# Patient Record
Sex: MALE | Race: BLACK OR AFRICAN AMERICAN | NOT HISPANIC OR LATINO | Employment: FULL TIME | URBAN - METROPOLITAN AREA
[De-identification: names, ages, dates, MRNs, and addresses within clinical notes are randomized per-mention and may not be internally consistent; named-entity substitution may affect disease eponyms.]

---

## 2023-03-16 LAB
RPR MONITORING: NORMAL
VDRL TITER: ABNORMAL
VDRL: REACTIVE

## 2023-08-24 LAB
CHLAMYDIA TRACH., AMPLIFIED: NEGATIVE
HEPATITIS B VIRUS SURFACE AG PRESENCE IN SERUM: NONREACTIVE
HEPATITIS C VIRUS AB PRESENCE IN SERUM: NONREACTIVE
HIV 1/ 2 AG/AB SCREEN: NONREACTIVE
N. GONORRHEA, AMPLIFIED: NEGATIVE
SYPHILIS TOTAL AB: REACTIVE

## 2023-08-25 LAB
RPR: NONREACTIVE
SYPHILIS INTEGRATED INTERPRETATION: ABNORMAL
SYPHILIS TOTAL AB: REACTIVE
TREPONEMA PALLIDUM CONFIRMATION: REACTIVE

## 2024-01-31 ENCOUNTER — LAB (OUTPATIENT)
Dept: LAB | Facility: LAB | Age: 29
End: 2024-01-31
Payer: COMMERCIAL

## 2024-01-31 ENCOUNTER — OFFICE VISIT (OUTPATIENT)
Dept: PRIMARY CARE | Facility: CLINIC | Age: 29
End: 2024-01-31
Payer: COMMERCIAL

## 2024-01-31 VITALS
HEIGHT: 74 IN | WEIGHT: 290 LBS | SYSTOLIC BLOOD PRESSURE: 114 MMHG | BODY MASS INDEX: 37.22 KG/M2 | TEMPERATURE: 98 F | DIASTOLIC BLOOD PRESSURE: 59 MMHG | HEART RATE: 72 BPM | OXYGEN SATURATION: 99 % | RESPIRATION RATE: 16 BRPM

## 2024-01-31 DIAGNOSIS — E55.9 VITAMIN D DEFICIENCY: ICD-10-CM

## 2024-01-31 DIAGNOSIS — K76.0 FATTY LIVER: ICD-10-CM

## 2024-01-31 DIAGNOSIS — E66.09 CLASS 2 OBESITY DUE TO EXCESS CALORIES WITHOUT SERIOUS COMORBIDITY WITH BODY MASS INDEX (BMI) OF 37.0 TO 37.9 IN ADULT: ICD-10-CM

## 2024-01-31 DIAGNOSIS — Z23 ENCOUNTER FOR IMMUNIZATION: ICD-10-CM

## 2024-01-31 DIAGNOSIS — J45.20 INTERMITTENT ASTHMA, UNSPECIFIED ASTHMA SEVERITY, UNSPECIFIED WHETHER COMPLICATED (HHS-HCC): ICD-10-CM

## 2024-01-31 DIAGNOSIS — Z00.00 ANNUAL PHYSICAL EXAM: ICD-10-CM

## 2024-01-31 DIAGNOSIS — Z00.00 ANNUAL PHYSICAL EXAM: Primary | ICD-10-CM

## 2024-01-31 LAB
ERYTHROCYTE [DISTWIDTH] IN BLOOD BY AUTOMATED COUNT: 12.4 % (ref 11.5–14.5)
HCT VFR BLD AUTO: 44.5 % (ref 41–52)
HGB BLD-MCNC: 15.7 G/DL (ref 13.5–17.5)
MCH RBC QN AUTO: 32.7 PG (ref 26–34)
MCHC RBC AUTO-ENTMCNC: 35.3 G/DL (ref 32–36)
MCV RBC AUTO: 93 FL (ref 80–100)
NRBC BLD-RTO: 0 /100 WBCS (ref 0–0)
PLATELET # BLD AUTO: 269 X10*3/UL (ref 150–450)
RBC # BLD AUTO: 4.8 X10*6/UL (ref 4.5–5.9)
WBC # BLD AUTO: 4.3 X10*3/UL (ref 4.4–11.3)

## 2024-01-31 PROCEDURE — 90471 IMMUNIZATION ADMIN: CPT | Performed by: FAMILY MEDICINE

## 2024-01-31 PROCEDURE — 80061 LIPID PANEL: CPT

## 2024-01-31 PROCEDURE — 99395 PREV VISIT EST AGE 18-39: CPT | Performed by: FAMILY MEDICINE

## 2024-01-31 PROCEDURE — 36415 COLL VENOUS BLD VENIPUNCTURE: CPT

## 2024-01-31 PROCEDURE — 90715 TDAP VACCINE 7 YRS/> IM: CPT | Performed by: FAMILY MEDICINE

## 2024-01-31 PROCEDURE — 85027 COMPLETE CBC AUTOMATED: CPT

## 2024-01-31 PROCEDURE — 80048 BASIC METABOLIC PNL TOTAL CA: CPT

## 2024-01-31 PROCEDURE — 4004F PT TOBACCO SCREEN RCVD TLK: CPT | Performed by: FAMILY MEDICINE

## 2024-01-31 PROCEDURE — 84450 TRANSFERASE (AST) (SGOT): CPT

## 2024-01-31 PROCEDURE — 82306 VITAMIN D 25 HYDROXY: CPT

## 2024-01-31 PROCEDURE — 3008F BODY MASS INDEX DOCD: CPT | Performed by: FAMILY MEDICINE

## 2024-01-31 PROCEDURE — 84460 ALANINE AMINO (ALT) (SGPT): CPT

## 2024-01-31 ASSESSMENT — PATIENT HEALTH QUESTIONNAIRE - PHQ9
2. FEELING DOWN, DEPRESSED OR HOPELESS: NOT AT ALL
SUM OF ALL RESPONSES TO PHQ9 QUESTIONS 1 AND 2: 0
1. LITTLE INTEREST OR PLEASURE IN DOING THINGS: NOT AT ALL

## 2024-01-31 NOTE — PATIENT INSTRUCTIONS
Fasting labs.  Liver ultrasound.  Pulmonary function tests ordered (DO NOT USE INHALER ON DAY OF PFTs).  Recommend weight loss efforts (see www.yourweightmatters.org/category/nutrition for ideas).   Recommend smoking cessation.  Call Tobacco Quit Line (4-789-QUIT-NOW) for resources and support.   Tetanus shot provided.    F/U 1 year: Annual wellness visit.

## 2024-01-31 NOTE — PROGRESS NOTES
"Subjective   Patient ID: Baldev Gonzalez is a 28 y.o. male who presents for Annual Exam.    HPI   Initial visit.    Patient's health is described as good.  Regular dental visits: Yes.  Dental hygiene (brushing/flossing) regularly performed: Yes.  Corrective lenses: Yes.  Vision problems: No.  Last eye exam within 1 year: No.  Hearing loss: No.  Requests audiology referral: No.  Immunizations up to date: No (tetanus).  Healthy diet: Yes.  Regular exercise: No.  Trying to lose weight: No.  Requests nutrition/weight loss referral: No.  Sexually active: Yes.  Using contraception: No.  Requests STD screening: No.  Colon cancer screening up to date: N/A.  Lung cancer screening up to date: N/A.  Hepatitis C screening up to date: Yes.    H/O Asthma.  Dx as a child, never had PFTs.  No maintenance inhaler.  Only needs rescue inhaler when he gets respiratory infections.    H/O Vit d deficiency.  Requests Vit D level.    Review of Systems  No other complaints.     Objective   /59   Pulse 72   Temp 36.7 °C (98 °F)   Resp 16   Ht 1.88 m (6' 2\")   Wt 132 kg (290 lb)   SpO2 99%   BMI 37.23 kg/m²     Physical Exam  Constitutional:       General: He is not in acute distress.     Appearance: He is obese.   HENT:      Head: Normocephalic.      Right Ear: Tympanic membrane normal.      Left Ear: Tympanic membrane normal.      Mouth/Throat:      Pharynx: Oropharynx is clear. No oropharyngeal exudate or posterior oropharyngeal erythema.   Eyes:      Extraocular Movements: Extraocular movements intact.      Conjunctiva/sclera: Conjunctivae normal.      Pupils: Pupils are equal, round, and reactive to light.   Neck:      Vascular: No carotid bruit.   Cardiovascular:      Rate and Rhythm: Normal rate and regular rhythm.      Heart sounds: Normal heart sounds. No murmur heard.     No friction rub. No gallop.   Pulmonary:      Effort: Pulmonary effort is normal.      Breath sounds: Normal breath sounds. No wheezing, rhonchi or " rales.   Abdominal:      General: Bowel sounds are normal. There is no distension.      Palpations: Abdomen is soft. There is no mass.      Tenderness: There is no abdominal tenderness. There is no guarding or rebound.   Lymphadenopathy:      Cervical: No cervical adenopathy.   Skin:     Coloration: Skin is not jaundiced or pale.   Neurological:      General: No focal deficit present.      Mental Status: He is oriented to person, place, and time.   Psychiatric:         Mood and Affect: Mood normal.         Behavior: Behavior normal.     Assessment/Plan   Diagnoses and all orders for this visit:  Annual physical exam  -     CBC; Future  -     Basic Metabolic Panel; Future  -     Lipid Panel; Future  -     Aspartate Aminotransferase; Future  -     Alanine Aminotransferase; Future  Intermittent asthma, unspecified asthma severity, unspecified whether complicated  -     Complete Pulmonary Function Test Pre/Post Bronchodialator (Spirometry Pre/Post/DLCO/Lung Volumes); Future  Fatty liver  -     US abdomen limited liver; Future  Vitamin D deficiency  -     Vitamin D 25-Hydroxy,Total (for eval of Vitamin D levels); Future  Encounter for immunization  -     Tdap vaccine, age 7 years and older  Class 2 obesity due to excess calories without serious comorbidity with body mass index (BMI) of 37.0 to 37.9 in adult    Fasting labs.  Liver ultrasound.  Pulmonary function tests ordered (DO NOT USE INHALER ON DAY OF PFTs).  Recommend weight loss efforts (see www.yourweightmatters.org/category/nutrition for ideas).   Recommend smoking cessation.  Call Tobacco Quit Line (8-868-QUIT-NOW) for resources and support.   Tetanus shot provided.    F/U 1 year: Annual wellness visit.

## 2024-02-01 LAB
25(OH)D3 SERPL-MCNC: 21 NG/ML (ref 30–100)
ALT SERPL W P-5'-P-CCNC: 62 U/L (ref 10–52)
ANION GAP SERPL CALC-SCNC: 11 MMOL/L (ref 10–20)
AST SERPL W P-5'-P-CCNC: 34 U/L (ref 9–39)
BUN SERPL-MCNC: 7 MG/DL (ref 6–23)
CALCIUM SERPL-MCNC: 10 MG/DL (ref 8.6–10.6)
CHLORIDE SERPL-SCNC: 105 MMOL/L (ref 98–107)
CHOLEST SERPL-MCNC: 213 MG/DL (ref 0–199)
CHOLESTEROL/HDL RATIO: 5.2
CO2 SERPL-SCNC: 29 MMOL/L (ref 21–32)
CREAT SERPL-MCNC: 1.07 MG/DL (ref 0.5–1.3)
EGFRCR SERPLBLD CKD-EPI 2021: >90 ML/MIN/1.73M*2
GLUCOSE SERPL-MCNC: 104 MG/DL (ref 74–99)
HDLC SERPL-MCNC: 40.8 MG/DL
LDLC SERPL CALC-MCNC: 150 MG/DL
NON HDL CHOLESTEROL: 172 MG/DL (ref 0–149)
POTASSIUM SERPL-SCNC: 3.8 MMOL/L (ref 3.5–5.3)
SODIUM SERPL-SCNC: 141 MMOL/L (ref 136–145)
TRIGL SERPL-MCNC: 112 MG/DL (ref 0–149)
VLDL: 22 MG/DL (ref 0–40)

## 2024-02-02 ENCOUNTER — APPOINTMENT (OUTPATIENT)
Dept: RADIOLOGY | Facility: CLINIC | Age: 29
End: 2024-02-02
Payer: COMMERCIAL

## 2024-02-05 ENCOUNTER — HOSPITAL ENCOUNTER (OUTPATIENT)
Dept: RADIOLOGY | Facility: CLINIC | Age: 29
Discharge: HOME | End: 2024-02-05
Payer: COMMERCIAL

## 2024-02-05 DIAGNOSIS — K76.0 FATTY LIVER: ICD-10-CM

## 2024-02-05 PROCEDURE — 76705 ECHO EXAM OF ABDOMEN: CPT | Performed by: RADIOLOGY

## 2024-02-05 PROCEDURE — 76705 ECHO EXAM OF ABDOMEN: CPT

## 2024-02-18 DIAGNOSIS — R73.09 ELEVATED GLUCOSE: Primary | ICD-10-CM

## 2024-02-18 DIAGNOSIS — D72.819 LEUKOPENIA, UNSPECIFIED TYPE: ICD-10-CM

## 2024-02-18 DIAGNOSIS — E55.9 VITAMIN D DEFICIENCY: ICD-10-CM

## 2024-02-18 RX ORDER — VIT C/E/ZN/COPPR/LUTEIN/ZEAXAN 250MG-90MG
25 CAPSULE ORAL DAILY
Qty: 1 CAPSULE | Refills: 0
Start: 2024-04-14

## 2024-02-18 RX ORDER — ERGOCALCIFEROL 1.25 MG/1
50000 CAPSULE ORAL
Qty: 8 CAPSULE | Refills: 0 | Status: SHIPPED | OUTPATIENT
Start: 2024-02-18 | End: 2024-04-14

## 2024-03-07 ENCOUNTER — LAB (OUTPATIENT)
Dept: LAB | Facility: LAB | Age: 29
End: 2024-03-07
Payer: COMMERCIAL

## 2024-03-07 DIAGNOSIS — R73.09 ELEVATED GLUCOSE: ICD-10-CM

## 2024-03-07 DIAGNOSIS — D72.819 LEUKOPENIA, UNSPECIFIED TYPE: ICD-10-CM

## 2024-03-07 PROCEDURE — 36415 COLL VENOUS BLD VENIPUNCTURE: CPT

## 2024-03-07 PROCEDURE — 83036 HEMOGLOBIN GLYCOSYLATED A1C: CPT

## 2024-03-07 PROCEDURE — 85025 COMPLETE CBC W/AUTO DIFF WBC: CPT

## 2024-03-07 PROCEDURE — 82607 VITAMIN B-12: CPT

## 2024-03-07 PROCEDURE — 82746 ASSAY OF FOLIC ACID SERUM: CPT

## 2024-03-08 LAB
BASOPHILS # BLD AUTO: 0.08 X10*3/UL (ref 0–0.1)
BASOPHILS NFR BLD AUTO: 1.7 %
EOSINOPHIL # BLD AUTO: 0.32 X10*3/UL (ref 0–0.7)
EOSINOPHIL NFR BLD AUTO: 6.7 %
ERYTHROCYTE [DISTWIDTH] IN BLOOD BY AUTOMATED COUNT: 12.5 % (ref 11.5–14.5)
EST. AVERAGE GLUCOSE BLD GHB EST-MCNC: 131 MG/DL
FOLATE SERPL-MCNC: 16.3 NG/ML
HBA1C MFR BLD: 6.2 %
HCT VFR BLD AUTO: 46.8 % (ref 41–52)
HGB BLD-MCNC: 15.9 G/DL (ref 13.5–17.5)
IMM GRANULOCYTES # BLD AUTO: 0.01 X10*3/UL (ref 0–0.7)
IMM GRANULOCYTES NFR BLD AUTO: 0.2 % (ref 0–0.9)
LYMPHOCYTES # BLD AUTO: 2.36 X10*3/UL (ref 1.2–4.8)
LYMPHOCYTES NFR BLD AUTO: 49.4 %
MCH RBC QN AUTO: 31.9 PG (ref 26–34)
MCHC RBC AUTO-ENTMCNC: 34 G/DL (ref 32–36)
MCV RBC AUTO: 94 FL (ref 80–100)
MONOCYTES # BLD AUTO: 0.59 X10*3/UL (ref 0.1–1)
MONOCYTES NFR BLD AUTO: 12.3 %
NEUTROPHILS # BLD AUTO: 1.42 X10*3/UL (ref 1.2–7.7)
NEUTROPHILS NFR BLD AUTO: 29.7 %
NRBC BLD-RTO: 0 /100 WBCS (ref 0–0)
PLATELET # BLD AUTO: 296 X10*3/UL (ref 150–450)
RBC # BLD AUTO: 4.99 X10*6/UL (ref 4.5–5.9)
VIT B12 SERPL-MCNC: 286 PG/ML (ref 211–911)
WBC # BLD AUTO: 4.8 X10*3/UL (ref 4.4–11.3)

## 2024-03-10 DIAGNOSIS — R73.03 PREDIABETES: Primary | ICD-10-CM

## 2024-04-30 NOTE — PROGRESS NOTES
HPI:  28 y.o. yo male  referred to me by   Jessica Sanches NP for infertility, wife seeing OB/GYN.    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[employer], security    Previous Semen analysis / Labs:   SA in   Component      Latest Ref Rng 2022   Semen Collection Info SEE BELOW    Partners Name Carrol Camara    Partners Date of Birth 1995    Partners MRN [id number]    Requested Test: SEMEN ANALYSIS    [facility] Lab ID# [id number]    Abstinence (days) 3    Collected Completely YES    Collection Location [facility]    Collection Method MASTURBATION    Received time 1,049    Analyzed Time 1,104    Semen Examination SEE BELOW    Appearance (Semen) NORMAL    Viscosity (Semen) ABNORMAL    Liquefaction (Semen) NORMAL    Debris (Semen) YES    Clumps (Semen) YES    Agglutination (Semen) NO    Volume (Semen)      >=1.5 mL 1.0 !    Concentration(Semen)      >=15.00 mill/mL 43.96    Total Motility (Semen)      >=40 % 46    Prog. Motility (Semen)      >=32 % 42    Non Prog. Motility (Semen)      % 4    Total No of Sperm (Semen)      >=39 mill 43.96    Total No of Motile (Semen)      mill 20.22    Total No of Rnd Cells (Semen)      <=5 mill 0.9    Morphology SEE BELOW    % Normal (Semen)      >=4 % 4.0    % Head defects (Semen)      % 95.0    % Neck Midpiece (Semen)      % 25.3    % Tail defects (Semen)      % 7.5    % Ex Residual Cytoplasm (Semen)      % 0.5    Tot. No of Norm. Sperm (Semen)      mill 1.758    Tot. No of Norm. Motile Sperm (Semen)      mill 0.809    Urine Examination SEE BELOW    Specimen Comment SEE BELOW    Test Comment TSEMN    Performed by: AARON    Verified By: SEE [facility]   "  Performing Location [facility]         T normal in 2021    No results found for: \"TESTOSTERONE\"  Lab Results   Component Value Date    LH 7.7 07/28/2021     Lab Results   Component Value Date    FSH 11.1 07/28/2021     No components found for: \"ESTRADIAL\"  Lab Results   Component Value Date    PROLACTIN 8.4 07/28/2021       PREVIOUS RISK FACTORS  History of testicular exposure to chemicals, radiation, or toxins: None  History of high fever, epididymitis, orchitis, prostatitis, sexually transmitted diseases, or trauma to the testicles: None  History of a varicocele, testicular torsion, cryptorchidism, postpubertile mumps or a family history of infertility: None    Coital Frequency: tracking ovulation  Coital Lubricants: None  Problems with erections or ejaculation: No  Smoker? Cigars occasionally  Previous Testosterone or anabolic steroid Use: No      PRIOR Assisted Reproductive Technology:  None      PMH:  Past Medical History:   Diagnosis Date    Chlamydial infection, unspecified 10/05/2016    Chlamydia    Latent syphilis, unspecified as early or late 09/06/2022    Positive VDRL test    Personal history of other diseases of the respiratory system     History of asthma        PSH:  Past Surgical History:   Procedure Laterality Date    FOOT SURGERY Bilateral 08/03/2020        Medications:    Current Outpatient Medications:     albuterol 90 mcg/actuation aerosol powdr breath activated inhaler, Inhale 2 puffs every 4 hours., Disp: , Rfl:     cholecalciferol (Vitamin D-3) 25 MCG (1000 UT) capsule, Take 1 capsule (25 mcg) by mouth once daily. Do not start before April 14, 2024., Disp: 1 capsule, Rfl: 0    Allergy:  No Known Allergies     Exam  Testicles descended bilaterally, nontender, no masses  Vasa palpable bilaterally  Penis circ'd, no lesions, no plaques      Assessment/Plan  #infertility    Semen analysis with strict morphology  Fertility Panel:   serum Estradiol, FSH + LH, serum Prolactin, Testosterone "       Fu after virtually        Scribe Attestation  By signing my name below, I, Alvin Elmore, attest that this documentation  has been prepared under the direction and in the presence of Rishabh Gilbert MD.

## 2024-05-01 ENCOUNTER — OFFICE VISIT (OUTPATIENT)
Dept: UROLOGY | Facility: CLINIC | Age: 29
End: 2024-05-01
Payer: COMMERCIAL

## 2024-05-01 VITALS — TEMPERATURE: 97.4 F

## 2024-05-01 DIAGNOSIS — R86.8 LOW VOLUME OF EJACULATED SEMEN: Primary | ICD-10-CM

## 2024-05-01 DIAGNOSIS — Z31.69 INFERTILITY COUNSELING: ICD-10-CM

## 2024-05-01 PROCEDURE — 3008F BODY MASS INDEX DOCD: CPT | Performed by: UROLOGY

## 2024-05-01 PROCEDURE — 4004F PT TOBACCO SCREEN RCVD TLK: CPT | Performed by: UROLOGY

## 2024-05-01 PROCEDURE — 99214 OFFICE O/P EST MOD 30 MIN: CPT | Performed by: UROLOGY

## 2024-05-01 ASSESSMENT — PAIN SCALES - GENERAL: PAINLEVEL: 0-NO PAIN

## 2024-05-01 NOTE — LETTER
May 1, 2024     Patient: Baldev Gonzalez   YOB: 1995   Date of Visit: 5/1/2024       To Whom It May Concern:    Baldev Gonzalez was seen in my clinic on 5/1/2024 at 2:30 pm. Please excuse Baldev for his absence from work on this day to make the appointment.    If you have any questions or concerns, please don't hesitate to call.         Sincerely,         Rishabh Gilbert MD        CC:   No Recipients

## 2024-05-20 ENCOUNTER — OFFICE VISIT (OUTPATIENT)
Dept: ORTHOPEDIC SURGERY | Facility: CLINIC | Age: 29
End: 2024-05-20
Payer: COMMERCIAL

## 2024-05-20 ENCOUNTER — HOSPITAL ENCOUNTER (OUTPATIENT)
Dept: RADIOLOGY | Facility: CLINIC | Age: 29
Discharge: HOME | End: 2024-05-20
Payer: COMMERCIAL

## 2024-05-20 VITALS — HEIGHT: 74 IN | BODY MASS INDEX: 37.24 KG/M2 | WEIGHT: 290.2 LBS

## 2024-05-20 DIAGNOSIS — M76.52 PATELLAR TENDINITIS OF BOTH KNEES: Primary | ICD-10-CM

## 2024-05-20 DIAGNOSIS — M25.562 BILATERAL CHRONIC KNEE PAIN: ICD-10-CM

## 2024-05-20 DIAGNOSIS — G89.29 BILATERAL CHRONIC KNEE PAIN: ICD-10-CM

## 2024-05-20 DIAGNOSIS — M25.561 BILATERAL CHRONIC KNEE PAIN: ICD-10-CM

## 2024-05-20 DIAGNOSIS — M76.51 PATELLAR TENDINITIS OF BOTH KNEES: Primary | ICD-10-CM

## 2024-05-20 DIAGNOSIS — M16.0 PRIMARY OSTEOARTHRITIS OF BOTH HIPS: ICD-10-CM

## 2024-05-20 PROCEDURE — 73564 X-RAY EXAM KNEE 4 OR MORE: CPT | Mod: BILATERAL PROCEDURE | Performed by: RADIOLOGY

## 2024-05-20 PROCEDURE — 3008F BODY MASS INDEX DOCD: CPT | Performed by: ORTHOPAEDIC SURGERY

## 2024-05-20 PROCEDURE — 4004F PT TOBACCO SCREEN RCVD TLK: CPT | Performed by: ORTHOPAEDIC SURGERY

## 2024-05-20 PROCEDURE — 73564 X-RAY EXAM KNEE 4 OR MORE: CPT | Mod: 50

## 2024-05-20 PROCEDURE — 99204 OFFICE O/P NEW MOD 45 MIN: CPT | Performed by: ORTHOPAEDIC SURGERY

## 2024-05-20 NOTE — LETTER
May 20, 2024     Dorian Kirk MD  [address]  [facility]  [place] [address]    Patient: Baldev Gonzalez   YOB: 1995   Date of Visit: 5/20/2024       Dear Dr. Dorian Kirk MD:    Thank you for referring Baldev Gonzalez to me for evaluation. Below are my notes for this consultation.  If you have questions, please do not hesitate to call me. I look forward to following your patient along with you.       Sincerely,     Wendie Ray,       CC: No Recipients  ______________________________________________________________________________________    PRIMARY CARE PHYSICIAN: Dorian Kirk MD  REFERRING PROVIDER: No referring provider defined for this encounter.     CONSULT ORTHOPAEDIC: Knee Evaluation        ASSESSMENT & PLAN    IMPRESSION:   1.  Bilateral knee patellar tendinitis  2.  Primary osteoarthritis, bilateral hips    PLAN:   Discussed with patient findings above.  Reviewed his current knee x-rays with him and additionally reviewed his previous pelvis x-ray with him from 2022.  Regarding his knees his symptoms seem to be likely secondary to quadricep deconditioning with resultant patellar tendinitis.  At minimum we discussed starting a knee conditioning program to work on extensor mechanism range of motion and progressive strengthening.  We did also discuss that he should focus on hamstring stretching and strengthening as well.  Of note we brought up that on his physical exam he does have significant stiffness of his hips with pain in his left hip when compared to the right and from review of previous x-rays patient starting develop a fair amount of arthritis with significant medial pole disease of bilateral hips with the left worse than right.  While his hip is only symptomatic on examination we discussed that this may become a progressive problem in the future to the point where he may require hip replacement but unfortunately given the amount of  changes he has at this time I do not see anything short of a hip replacement that will solve his symptoms especially for his left hip.  May revisit this in the future the potential corticosteroid junction as needed.  Will follow-up as needed otherwise regarding the above conditions.      SUBJECTIVE  CHIEF COMPLAINT:   Chief Complaint   Patient presents with   • Right Knee - Pain   • Left Knee - Pain        HPI: Baldev Gonzalez is a 28 y.o. patient. Baldev Gonzalez has had progressive problems with their both knees over the past  few  years. They do not report any trauma. They do not report any constant or progressive numbness or tingling in their legs. Their symptoms are interfering with activities which include playing sports.  He has no regular exercise program and states when his knee does bother him it hurts more so in the front of the knee.  He states that he is worked with a chiropractor in the past for his hips due to stiffness    FUNCTIONAL STATUS: not limited.  AMBULATORY STATUS: Independent community ambulation without devices  PREVIOUS TREATMENTS: NSAIDS as needed in the past with mild improvement  Therapy currently works out and does exercises with mild improvement  Bracing: OTC brace in the past with mild improvement  HISTORY OF SURGERY ON AFFECTED KNEE(S): No       REVIEW OF SYSTEMS  Constitutional: See HPI for pain assessment, No significant weight loss, recent trauma  Cardiovascular: No chest pain, shortness of breath  Respiratory: No difficulty breathing, cough  Gastrointestinal: No nausea, vomiting, diarrhea, constipation  Musculoskeletal: Noted in HPI, positive for pain, restricted motion, stiffness  Integumentary: No rashes, easy bruising, redness   Neurological: no numbness or tingling in extremities, no gait disturbances   Psychiatric: No mood changes, memory changes, social issues  Heme/Lymph: no excessive swelling, easy bruising, excessive bleeding  ENT: No hearing changes  Eyes: No vision  changes    Past Medical History:   Diagnosis Date   • Chlamydial infection, unspecified 10/05/2016    Chlamydia   • Latent syphilis, unspecified as early or late 09/06/2022    Positive VDRL test   • Personal history of other diseases of the respiratory system     History of asthma        No Known Allergies     Past Surgical History:   Procedure Laterality Date   • FOOT SURGERY Bilateral 08/03/2020        Family History   Problem Relation Name Age of Onset   • Hypertension Mother     • Hypertension Father          Social History     Socioeconomic History   • Marital status:      Spouse name: Not on file   • Number of children: Not on file   • Years of education: Not on file   • Highest education level: Not on file   Occupational History   • Not on file   Tobacco Use   • Smoking status: Every Day     Types: Cigars     Start date: 2017   • Smokeless tobacco: Never   Substance and Sexual Activity   • Alcohol use: Not Currently   • Drug use: Not Currently   • Sexual activity: Not on file   Other Topics Concern   • Not on file   Social History Narrative   • Not on file     Social Determinants of Health     Financial Resource Strain: Not on file   Food Insecurity: Not on file   Transportation Needs: Not on file   Physical Activity: Not on file   Stress: Not on file   Social Connections: Not on file   Intimate Partner Violence: Not on file   Housing Stability: Not on file        CURRENT MEDICATIONS:   Current Outpatient Medications   Medication Sig Dispense Refill   • albuterol 90 mcg/actuation aerosol powdr breath activated inhaler Inhale 2 puffs every 4 hours.     • cholecalciferol (Vitamin D-3) 25 MCG (1000 UT) capsule Take 1 capsule (25 mcg) by mouth once daily. Do not start before April 14, 2024. 1 capsule 0     No current facility-administered medications for this visit.        OBJECTIVE    PHYSICAL EXAM      12/27/2021     1:22 PM 2/25/2022    11:41 AM 5/27/2022    10:34 AM 3/2/2023     1:49 PM 3/14/2023      "8:58 AM 1/31/2024     1:05 PM 5/1/2024     2:42 PM   Vitals   Systolic 130 137 128 143 142 114    Diastolic 80 88 78 93 89 59    Heart Rate 96 78 75 63 91 72    Temp 36.7 °C (98 °F)  36.2 °C (97.1 °F)  36.8 °C (98.3 °F) 36.7 °C (98 °F) 36.3 °C (97.4 °F)   Resp 18 18    16    Height (in) 1.854 m (6' 1\") 1.854 m (6' 1\") 1.854 m (6' 1\") 1.854 m (6' 1\") 1.88 m (6' 2\") 1.88 m (6' 2\")    Weight (lb) 285 255 294 294.56 288 290    BMI 37.6 kg/m2 33.64 kg/m2 38.79 kg/m2 38.86 kg/m2 36.98 kg/m2 37.23 kg/m2    BSA (m2) 2.58 m2 2.44 m2 2.62 m2 2.63 m2 2.62 m2 2.63 m2    Visit Report      Report Report      There is no height or weight on file to calculate BMI.    GENERAL: A/Ox3, NAD. Appears healthy, well nourished  PSYCHIATRIC: Mood stable, appropriate memory recall  EYES: EOM intact, no scleral icterus  CARDIAC: regular rate  LUNGS: Breathing non-labored  SKIN: no erythema, rashes, or ecchymoses     MUSCULOSKELETAL:  Laterality: bilateral Knee Exam  - Alignment: Neutral  - ROM: Prone range of motion is from about 0 to 105 degrees on the right knee and 0 to 115 degrees on the left knee  - Effusion: None evident  - Strength: knee extension and flexion 5/5, EHL/PF/DF motor intact  - Palpation: Mild tenderness to palpation along patellar tendons of bilateral knees that is reproduced with resisted knee extension  - Stability: Anterior/Posterior stable, varus/valgus stable  - Gait: normal  - Hip Exam: Has noted stiffness with internal rotation and pain in the groin with the left hip with internal rotation    NEUROVASCULAR:  - Neurovascular Status: sensation intact to light touch distally  - Capillary refill brisk at extremities, Bilateral dorsalis pedis pulse 2+     IMAGING:  Multiple views of the affected bilateral knee(s) demonstrate: Well-maintained joint space with no acute fracture, dislocation or deformity.  Previous x-rays of the hips and pelvis was reviewed as well with significant medial pole disease and moderate to severe " arthritic changes of the left hip and mild to mod arthritic changes in the right hip.   X-rays were personally reviewed and interpreted by me.  Radiology reports were reviewed by me as well, if readily available at the time.        Wendie Ray DO  Attending Surgeon  Joint Replacement and Adult Reconstructive Surgery  [place], [place]

## 2024-05-20 NOTE — PROGRESS NOTES
PRIMARY CARE PHYSICIAN: Dorian Kirk MD  REFERRING PROVIDER: No referring provider defined for this encounter.     CONSULT ORTHOPAEDIC: Knee Evaluation        ASSESSMENT & PLAN    IMPRESSION:   1.  Bilateral knee patellar tendinitis  2.  Primary osteoarthritis, bilateral hips    PLAN:   Discussed with patient findings above.  Reviewed his current knee x-rays with him and additionally reviewed his previous pelvis x-ray with him from 2022.  Regarding his knees his symptoms seem to be likely secondary to quadricep deconditioning with resultant patellar tendinitis.  At minimum we discussed starting a knee conditioning program to work on extensor mechanism range of motion and progressive strengthening.  We did also discuss that he should focus on hamstring stretching and strengthening as well.  Of note we brought up that on his physical exam he does have significant stiffness of his hips with pain in his left hip when compared to the right and from review of previous x-rays patient starting develop a fair amount of arthritis with significant medial pole disease of bilateral hips with the left worse than right.  While his hip is only symptomatic on examination we discussed that this may become a progressive problem in the future to the point where he may require hip replacement but unfortunately given the amount of changes he has at this time I do not see anything short of a hip replacement that will solve his symptoms especially for his left hip.  May revisit this in the future the potential corticosteroid junction as needed.  Will follow-up as needed otherwise regarding the above conditions.      SUBJECTIVE  CHIEF COMPLAINT:   Chief Complaint   Patient presents with    Right Knee - Pain    Left Knee - Pain        HPI: Baledv Gonzalez is a 28 y.o. patient. Baldev Gonzalez has had progressive problems with their both knees over the past  few  years. They do not report any trauma. They do not report any constant or  progressive numbness or tingling in their legs. Their symptoms are interfering with activities which include playing sports.  He has no regular exercise program and states when his knee does bother him it hurts more so in the front of the knee.  He states that he is worked with a chiropractor in the past for his hips due to stiffness    FUNCTIONAL STATUS: not limited.  AMBULATORY STATUS: Independent community ambulation without devices  PREVIOUS TREATMENTS: NSAIDS as needed in the past with mild improvement  Therapy currently works out and does exercises with mild improvement  Bracing: OTC brace in the past with mild improvement  HISTORY OF SURGERY ON AFFECTED KNEE(S): No       REVIEW OF SYSTEMS  Constitutional: See HPI for pain assessment, No significant weight loss, recent trauma  Cardiovascular: No chest pain, shortness of breath  Respiratory: No difficulty breathing, cough  Gastrointestinal: No nausea, vomiting, diarrhea, constipation  Musculoskeletal: Noted in HPI, positive for pain, restricted motion, stiffness  Integumentary: No rashes, easy bruising, redness   Neurological: no numbness or tingling in extremities, no gait disturbances   Psychiatric: No mood changes, memory changes, social issues  Heme/Lymph: no excessive swelling, easy bruising, excessive bleeding  ENT: No hearing changes  Eyes: No vision changes    Past Medical History:   Diagnosis Date    Chlamydial infection, unspecified 10/05/2016    Chlamydia    Latent syphilis, unspecified as early or late 09/06/2022    Positive VDRL test    Personal history of other diseases of the respiratory system     History of asthma        No Known Allergies     Past Surgical History:   Procedure Laterality Date    FOOT SURGERY Bilateral 08/03/2020        Family History   Problem Relation Name Age of Onset    Hypertension Mother      Hypertension Father          Social History     Socioeconomic History    Marital status:      Spouse name: Not on file     "Number of children: Not on file    Years of education: Not on file    Highest education level: Not on file   Occupational History    Not on file   Tobacco Use    Smoking status: Every Day     Types: Cigars     Start date: 2017    Smokeless tobacco: Never   Substance and Sexual Activity    Alcohol use: Not Currently    Drug use: Not Currently    Sexual activity: Not on file   Other Topics Concern    Not on file   Social History Narrative    Not on file     Social Determinants of Health     Financial Resource Strain: Not on file   Food Insecurity: Not on file   Transportation Needs: Not on file   Physical Activity: Not on file   Stress: Not on file   Social Connections: Not on file   Intimate Partner Violence: Not on file   Housing Stability: Not on file        CURRENT MEDICATIONS:   Current Outpatient Medications   Medication Sig Dispense Refill    albuterol 90 mcg/actuation aerosol powdr breath activated inhaler Inhale 2 puffs every 4 hours.      cholecalciferol (Vitamin D-3) 25 MCG (1000 UT) capsule Take 1 capsule (25 mcg) by mouth once daily. Do not start before April 14, 2024. 1 capsule 0     No current facility-administered medications for this visit.        OBJECTIVE    PHYSICAL EXAM      12/27/2021     1:22 PM 2/25/2022    11:41 AM 5/27/2022    10:34 AM 3/2/2023     1:49 PM 3/14/2023     8:58 AM 1/31/2024     1:05 PM 5/1/2024     2:42 PM   Vitals   Systolic 130 137 128 143 142 114    Diastolic 80 88 78 93 89 59    Heart Rate 96 78 75 63 91 72    Temp 36.7 °C (98 °F)  36.2 °C (97.1 °F)  36.8 °C (98.3 °F) 36.7 °C (98 °F) 36.3 °C (97.4 °F)   Resp 18 18    16    Height (in) 1.854 m (6' 1\") 1.854 m (6' 1\") 1.854 m (6' 1\") 1.854 m (6' 1\") 1.88 m (6' 2\") 1.88 m (6' 2\")    Weight (lb) 285 255 294 294.56 288 290    BMI 37.6 kg/m2 33.64 kg/m2 38.79 kg/m2 38.86 kg/m2 36.98 kg/m2 37.23 kg/m2    BSA (m2) 2.58 m2 2.44 m2 2.62 m2 2.63 m2 2.62 m2 2.63 m2    Visit Report      Report Report      There is no height or weight on " file to calculate BMI.    GENERAL: A/Ox3, NAD. Appears healthy, well nourished  PSYCHIATRIC: Mood stable, appropriate memory recall  EYES: EOM intact, no scleral icterus  CARDIAC: regular rate  LUNGS: Breathing non-labored  SKIN: no erythema, rashes, or ecchymoses     MUSCULOSKELETAL:  Laterality: bilateral Knee Exam  - Alignment: Neutral  - ROM: Prone range of motion is from about 0 to 105 degrees on the right knee and 0 to 115 degrees on the left knee  - Effusion: None evident  - Strength: knee extension and flexion 5/5, EHL/PF/DF motor intact  - Palpation: Mild tenderness to palpation along patellar tendons of bilateral knees that is reproduced with resisted knee extension  - Stability: Anterior/Posterior stable, varus/valgus stable  - Gait: normal  - Hip Exam: Has noted stiffness with internal rotation and pain in the groin with the left hip with internal rotation    NEUROVASCULAR:  - Neurovascular Status: sensation intact to light touch distally  - Capillary refill brisk at extremities, Bilateral dorsalis pedis pulse 2+     IMAGING:  Multiple views of the affected bilateral knee(s) demonstrate: Well-maintained joint space with no acute fracture, dislocation or deformity.  Previous x-rays of the hips and pelvis was reviewed as well with significant medial pole disease and moderate to severe arthritic changes of the left hip and mild to mod arthritic changes in the right hip.   X-rays were personally reviewed and interpreted by me.  Radiology reports were reviewed by me as well, if readily available at the time.        Wendie Ray DO  Attending Surgeon  [facility]  [place], [place]

## 2024-06-05 ENCOUNTER — APPOINTMENT (OUTPATIENT)
Dept: ENDOCRINOLOGY | Facility: CLINIC | Age: 29
End: 2024-06-05
Payer: COMMERCIAL

## 2024-06-21 ENCOUNTER — OFFICE VISIT (OUTPATIENT)
Dept: PRIMARY CARE | Facility: CLINIC | Age: 29
End: 2024-06-21
Payer: COMMERCIAL

## 2024-06-21 VITALS
SYSTOLIC BLOOD PRESSURE: 127 MMHG | HEIGHT: 74 IN | BODY MASS INDEX: 37.6 KG/M2 | WEIGHT: 293 LBS | OXYGEN SATURATION: 97 % | DIASTOLIC BLOOD PRESSURE: 80 MMHG | HEART RATE: 84 BPM | RESPIRATION RATE: 16 BRPM

## 2024-06-21 DIAGNOSIS — M16.0 ARTHRITIS OF BOTH HIPS: ICD-10-CM

## 2024-06-21 DIAGNOSIS — M76.51 PATELLAR TENDINITIS OF BOTH KNEES: ICD-10-CM

## 2024-06-21 DIAGNOSIS — M76.52 PATELLAR TENDINITIS OF BOTH KNEES: ICD-10-CM

## 2024-06-21 DIAGNOSIS — M54.50 MIDLINE LOW BACK PAIN WITHOUT SCIATICA, UNSPECIFIED CHRONICITY: Primary | ICD-10-CM

## 2024-06-21 PROCEDURE — 99214 OFFICE O/P EST MOD 30 MIN: CPT | Performed by: FAMILY MEDICINE

## 2024-06-21 PROCEDURE — 4004F PT TOBACCO SCREEN RCVD TLK: CPT | Performed by: FAMILY MEDICINE

## 2024-06-21 PROCEDURE — 3008F BODY MASS INDEX DOCD: CPT | Performed by: FAMILY MEDICINE

## 2024-06-21 RX ORDER — METHOCARBAMOL 500 MG/1
500 TABLET, FILM COATED ORAL 4 TIMES DAILY PRN
Qty: 30 TABLET | Refills: 0 | Status: SHIPPED | OUTPATIENT
Start: 2024-06-21

## 2024-06-21 RX ORDER — METHYLPREDNISOLONE 4 MG/1
TABLET ORAL
Qty: 21 TABLET | Refills: 0 | Status: SHIPPED | OUTPATIENT
Start: 2024-06-21 | End: 2024-06-28

## 2024-06-21 NOTE — PATIENT INSTRUCTIONS
Did not complete FMLA paperwork as discussed.  Oral steroid and muscle relaxer provided (do not operate heavy machinery while taking muscle relaxer).  Physical therapy referral.  Follow up with ortho as needed.    Follow up 7 months: Annual wellness visit.

## 2024-06-21 NOTE — LETTER
June 21, 2024     Patient: Baldev Gonzalez   YOB: 1995   Date of Visit: 6/21/2024       To Whom It May Concern:    Baldev Gonzalez was seen in my clinic on 6/21/2024 at 9:45 am. Please excuse Baldev for his absence from work on this day to make the appointment.    If you have any questions or concerns, please don't hesitate to call.         Sincerely,         Dorian Kirk MD        CC: No Recipients

## 2024-06-21 NOTE — PROGRESS NOTES
"Subjective   Patient ID: Baldev Gonzalez is a 28 y.o. male who presents for discuss FMLA forms.    HPI   Wants FMLA paperwork completed for musculoskeletal pain (back, hips, knees).  Saw ortho.  Dx w/bilateral knee patellar tendinitis, bilateral hip osteoarthritis.  States no active management provided.  States he was seen at urgent care and Dx w/sciatica (records not available).  States no treatment provided.  Pain located in midline low back.  Pain radiated to posterior thigh last week (does not remember if it was right or left).  Radiating pain completely resolved, pain now localized to midline low back.    Review of Systems  No other complaints.     Objective   /80   Pulse 84   Resp 16   Ht 1.88 m (6' 2\")   Wt 133 kg (293 lb)   SpO2 97%   BMI 37.62 kg/m²     Physical Exam  Constitutional:       General: He is not in acute distress.     Appearance: He is obese.   Musculoskeletal:      Lumbar back: No tenderness. Normal range of motion. Negative right straight leg raise test and negative left straight leg raise test.   Neurological:      Mental Status: He is oriented to person, place, and time.   Psychiatric:         Mood and Affect: Mood normal.         Behavior: Behavior normal.     Assessment/Plan   Diagnoses and all orders for this visit:  Midline low back pain without sciatica, unspecified chronicity  -     methylPREDNISolone (Medrol Dospak) 4 mg tablets; Take as directed on package.  -     methocarbamol (Robaxin) 500 mg tablet; Take 1 tablet (500 mg) by mouth 4 times a day as needed for muscle spasms.  -     Referral to Physical Therapy; Future  Patellar tendinitis of both knees  Arthritis of both hips    Did not complete FMLA paperwork as discussed.  Oral steroid and muscle relaxer provided (do not operate heavy machinery while taking muscle relaxer).  Physical therapy referral.  Follow up with ortho as needed.    Follow up 7 months: Annual wellness visit.  "

## 2025-04-07 ENCOUNTER — APPOINTMENT (OUTPATIENT)
Dept: PRIMARY CARE | Facility: CLINIC | Age: 30
End: 2025-04-07
Payer: COMMERCIAL

## 2025-04-14 ENCOUNTER — APPOINTMENT (OUTPATIENT)
Dept: PRIMARY CARE | Facility: CLINIC | Age: 30
End: 2025-04-14
Payer: COMMERCIAL

## 2025-04-14 VITALS
OXYGEN SATURATION: 94 % | BODY MASS INDEX: 37.09 KG/M2 | WEIGHT: 289 LBS | HEIGHT: 74 IN | TEMPERATURE: 98 F | SYSTOLIC BLOOD PRESSURE: 142 MMHG | DIASTOLIC BLOOD PRESSURE: 82 MMHG | HEART RATE: 79 BPM

## 2025-04-14 DIAGNOSIS — Z00.00 ANNUAL PHYSICAL EXAM: Primary | ICD-10-CM

## 2025-04-14 DIAGNOSIS — R03.0 ELEVATED BLOOD PRESSURE READING: ICD-10-CM

## 2025-04-14 DIAGNOSIS — E66.09 CLASS 2 OBESITY DUE TO EXCESS CALORIES WITHOUT SERIOUS COMORBIDITY WITH BODY MASS INDEX (BMI) OF 37.0 TO 37.9 IN ADULT: ICD-10-CM

## 2025-04-14 DIAGNOSIS — E66.812 CLASS 2 OBESITY DUE TO EXCESS CALORIES WITHOUT SERIOUS COMORBIDITY WITH BODY MASS INDEX (BMI) OF 37.0 TO 37.9 IN ADULT: ICD-10-CM

## 2025-04-14 DIAGNOSIS — Z11.3 SCREEN FOR STD (SEXUALLY TRANSMITTED DISEASE): ICD-10-CM

## 2025-04-14 DIAGNOSIS — K76.0 FATTY LIVER: ICD-10-CM

## 2025-04-14 DIAGNOSIS — R73.03 PREDIABETES: ICD-10-CM

## 2025-04-14 PROCEDURE — 3008F BODY MASS INDEX DOCD: CPT | Performed by: FAMILY MEDICINE

## 2025-04-14 PROCEDURE — 4004F PT TOBACCO SCREEN RCVD TLK: CPT | Performed by: FAMILY MEDICINE

## 2025-04-14 PROCEDURE — 99395 PREV VISIT EST AGE 18-39: CPT | Performed by: FAMILY MEDICINE

## 2025-04-14 ASSESSMENT — PATIENT HEALTH QUESTIONNAIRE - PHQ9
SUM OF ALL RESPONSES TO PHQ9 QUESTIONS 1 AND 2: 0
1. LITTLE INTEREST OR PLEASURE IN DOING THINGS: NOT AT ALL
1. LITTLE INTEREST OR PLEASURE IN DOING THINGS: NOT AT ALL
2. FEELING DOWN, DEPRESSED OR HOPELESS: NOT AT ALL
SUM OF ALL RESPONSES TO PHQ9 QUESTIONS 1 AND 2: 0
2. FEELING DOWN, DEPRESSED OR HOPELESS: NOT AT ALL

## 2025-04-14 ASSESSMENT — PAIN SCALES - GENERAL: PAINLEVEL_OUTOF10: 0-NO PAIN

## 2025-04-14 NOTE — PATIENT INSTRUCTIONS
Fasting labs.  Liver ultrasound.  Nutrition referral.    Blood pressure recommendations:  DASH diet.  Decrease sodium intake to <1,500 mg/day.  Recommend weight loss efforts (see www.yourweightmatters.org/category/nutrition for ideas).  Recommend exercising (brisk walking, jogging, swimming, bicycling) 30 minutes/day, 5 days/week.  Stop smoking (if applicable).  Call Tobacco Quit Line (9-708-QUIT-NOW) for resources and support.  Decrease alcohol intake (if applicable).     F/U 2-3 weeks: BP check, Review lab results.     Lab services: [address]  Hours: M-F 7:15a-6:00p  Phone: [phone], Option 1

## 2025-04-14 NOTE — PROGRESS NOTES
"Subjective   Patient ID: Baldev Gonzalez is a 29 y.o. male who presents for Annual Exam.    HPI   Patient's health is described as good.  Regular dental visits: Yes.  Dental hygiene (brushing/flossing) regularly performed: Yes.  Corrective lenses: Yes.  Vision problems: No.  Last eye exam within 1 year: No.  Hearing loss: No.  Requests audiology referral: No.  Immunizations up to date: Yes.  Healthy diet: Yes.  Regular exercise: No.  Trying to lose weight: No.  Requests nutrition/weight loss referral: Yes.  Sexually active: Yes.  Using contraception: No.  Requests STD screening: Yes.  Colon cancer screening up to date: N/A.  Lung cancer screening up to date: N/A.  Hepatitis C screening up to date: Yes.    Reviewed/Updated Active problem list, PMH, PSH, FH, SH, Meds, Allergies.    Review of Systems  No other complaints.      Objective   /82   Pulse 79   Temp 36.7 °C (98 °F) (Temporal)   Ht 1.88 m (6' 2\")   Wt 131 kg (289 lb)   SpO2 94%   BMI 37.11 kg/m²     Physical Exam  Constitutional:       General: He is not in acute distress.     Appearance: He is obese.   HENT:      Head: Normocephalic.      Right Ear: Tympanic membrane normal.      Left Ear: Tympanic membrane normal.      Mouth/Throat:      Pharynx: Oropharynx is clear. No oropharyngeal exudate or posterior oropharyngeal erythema.   Eyes:      Extraocular Movements: Extraocular movements intact.      Conjunctiva/sclera: Conjunctivae normal.      Pupils: Pupils are equal, round, and reactive to light.   Neck:      Thyroid: No thyromegaly.      Vascular: No carotid bruit.   Cardiovascular:      Rate and Rhythm: Normal rate and regular rhythm.      Heart sounds: Normal heart sounds. No murmur heard.     No friction rub. No gallop.   Pulmonary:      Effort: Pulmonary effort is normal.      Breath sounds: Normal breath sounds. No wheezing, rhonchi or rales.   Abdominal:      General: Bowel sounds are normal. There is no distension.      Palpations: " Abdomen is soft. There is no mass.      Tenderness: There is no abdominal tenderness. There is no guarding or rebound.   Lymphadenopathy:      Cervical: No cervical adenopathy.   Skin:     Coloration: Skin is not jaundiced or pale.   Neurological:      General: No focal deficit present.      Mental Status: He is oriented to person, place, and time.   Psychiatric:         Mood and Affect: Mood normal.         Behavior: Behavior normal.     Assessment/Plan   Diagnoses and all orders for this visit:  Annual physical exam  -     CBC; Future  -     Basic Metabolic Panel; Future  -     Lipid Panel; Future  Fatty liver  -     Hepatic Function Panel; Future  -     US abdomen limited liver; Future  Prediabetes  -     Hemoglobin A1C; Future  Screen for STD (sexually transmitted disease)  -     C. trachomatis / N. gonorrhoeae, Amplified, Urogenital; Future  -     Hepatitis C Antibody; Future  -     HIV 1/2 Antigen/Antibody Screen with Reflex to Confirmation; Future  -     Syphilis Screen with Reflex; Future  Class 2 obesity due to excess calories without serious comorbidity with body mass index (BMI) of 37.0 to 37.9 in adult  -     Referral to Nutrition Services; Future  Elevated blood pressure reading    Fasting labs.  Liver ultrasound.  Nutrition referral.    Blood pressure recommendations:  DASH diet.  Decrease sodium intake to <1,500 mg/day.  Recommend weight loss efforts (see www.yourweightmatters.org/category/nutrition for ideas).  Recommend exercising (brisk walking, jogging, swimming, bicycling) 30 minutes/day, 5 days/week.  Stop smoking (if applicable).  Call Tobacco Quit Line (8-874-QUIT-NOW) for resources and support.  Decrease alcohol intake (if applicable).     F/U 2-3 weeks: BP check, Review lab results.

## 2025-04-18 LAB
ANION GAP SERPL CALCULATED.4IONS-SCNC: 8 MMOL/L (CALC) (ref 7–17)
BUN SERPL-MCNC: 6 MG/DL (ref 7–25)
BUN/CREAT SERPL: 6 (CALC) (ref 6–22)
C TRACH RRNA SPEC QL NAA+PROBE: NOT DETECTED
CALCIUM SERPL-MCNC: 10.3 MG/DL (ref 8.6–10.3)
CHLORIDE SERPL-SCNC: 102 MMOL/L (ref 98–110)
CHOLEST SERPL-MCNC: 250 MG/DL
CHOLEST/HDLC SERPL: 5.2 (CALC)
CO2 SERPL-SCNC: 30 MMOL/L (ref 20–32)
CREAT SERPL-MCNC: 1 MG/DL (ref 0.6–1.26)
EGFRCR SERPLBLD CKD-EPI 2021: 103 ML/MIN/1.73M2
ERYTHROCYTE [DISTWIDTH] IN BLOOD BY AUTOMATED COUNT: 12.3 % (ref 11–15)
EST. AVERAGE GLUCOSE BLD GHB EST-MCNC: 177 MG/DL
EST. AVERAGE GLUCOSE BLD GHB EST-SCNC: 9.8 MMOL/L
GLUCOSE SERPL-MCNC: 112 MG/DL (ref 65–99)
HBA1C MFR BLD: 7.8 %
HCT VFR BLD AUTO: 45.2 % (ref 38.5–50)
HCV AB SERPL QL IA: NORMAL
HDLC SERPL-MCNC: 48 MG/DL
HGB BLD-MCNC: 15.6 G/DL (ref 13.2–17.1)
HIV 1+2 AB+HIV1 P24 AG SERPL QL IA: NORMAL
LDLC SERPL CALC-MCNC: 172 MG/DL (CALC)
MCH RBC QN AUTO: 31.8 PG (ref 27–33)
MCHC RBC AUTO-ENTMCNC: 34.5 G/DL (ref 32–36)
MCV RBC AUTO: 92.1 FL (ref 80–100)
N GONORRHOEA RRNA SPEC QL NAA+PROBE: NOT DETECTED
NONHDLC SERPL-MCNC: 202 MG/DL (CALC)
PLATELET # BLD AUTO: 284 THOUSAND/UL (ref 140–400)
PMV BLD REES-ECKER: 10.9 FL (ref 7.5–12.5)
POTASSIUM SERPL-SCNC: 4.3 MMOL/L (ref 3.5–5.3)
QUEST GC CT AMPLIFIED (ALWAYS MESSAGE): NORMAL
RBC # BLD AUTO: 4.91 MILLION/UL (ref 4.2–5.8)
RPR SER QL: ABNORMAL
SODIUM SERPL-SCNC: 140 MMOL/L (ref 135–146)
T PALLIDUM AB SER QL AGGL: REACTIVE
T PALLIDUM AB SER QL IA: POSITIVE
TRIGL SERPL-MCNC: 154 MG/DL
WBC # BLD AUTO: 3.6 THOUSAND/UL (ref 3.8–10.8)

## 2025-04-21 ENCOUNTER — HOSPITAL ENCOUNTER (OUTPATIENT)
Dept: RADIOLOGY | Facility: CLINIC | Age: 30
Discharge: HOME | End: 2025-04-21
Payer: COMMERCIAL

## 2025-04-21 DIAGNOSIS — K76.0 FATTY LIVER: ICD-10-CM

## 2025-04-21 PROCEDURE — 76705 ECHO EXAM OF ABDOMEN: CPT | Performed by: RADIOLOGY

## 2025-04-21 PROCEDURE — 76705 ECHO EXAM OF ABDOMEN: CPT

## 2025-04-28 ENCOUNTER — APPOINTMENT (OUTPATIENT)
Dept: PRIMARY CARE | Facility: CLINIC | Age: 30
End: 2025-04-28
Payer: COMMERCIAL

## 2025-04-28 VITALS
TEMPERATURE: 98 F | HEART RATE: 79 BPM | SYSTOLIC BLOOD PRESSURE: 142 MMHG | WEIGHT: 293 LBS | BODY MASS INDEX: 37.62 KG/M2 | OXYGEN SATURATION: 95 % | DIASTOLIC BLOOD PRESSURE: 88 MMHG

## 2025-04-28 DIAGNOSIS — I10 PRIMARY HYPERTENSION: ICD-10-CM

## 2025-04-28 DIAGNOSIS — D72.819 LEUKOPENIA, UNSPECIFIED TYPE: ICD-10-CM

## 2025-04-28 DIAGNOSIS — E11.9 TYPE 2 DIABETES MELLITUS WITHOUT COMPLICATION, WITHOUT LONG-TERM CURRENT USE OF INSULIN: Primary | ICD-10-CM

## 2025-04-28 DIAGNOSIS — E78.2 MIXED HYPERLIPIDEMIA: ICD-10-CM

## 2025-04-28 DIAGNOSIS — K76.0 FATTY LIVER: ICD-10-CM

## 2025-04-28 PROCEDURE — 3077F SYST BP >= 140 MM HG: CPT | Performed by: FAMILY MEDICINE

## 2025-04-28 PROCEDURE — 4004F PT TOBACCO SCREEN RCVD TLK: CPT | Performed by: FAMILY MEDICINE

## 2025-04-28 PROCEDURE — 4010F ACE/ARB THERAPY RXD/TAKEN: CPT | Performed by: FAMILY MEDICINE

## 2025-04-28 PROCEDURE — 3078F DIAST BP <80 MM HG: CPT | Performed by: FAMILY MEDICINE

## 2025-04-28 PROCEDURE — 99214 OFFICE O/P EST MOD 30 MIN: CPT | Performed by: FAMILY MEDICINE

## 2025-04-28 RX ORDER — ASPIRIN 81 MG/1
81 TABLET ORAL DAILY
Start: 2025-04-28

## 2025-04-28 RX ORDER — LISINOPRIL 10 MG/1
10 TABLET ORAL DAILY
Qty: 90 TABLET | Refills: 0 | Status: SHIPPED | OUTPATIENT
Start: 2025-04-28

## 2025-04-28 ASSESSMENT — PAIN SCALES - GENERAL: PAINLEVEL_OUTOF10: 0-NO PAIN

## 2025-04-28 NOTE — PATIENT INSTRUCTIONS
Reviewed lab results.  Get liver function tests as previously ordered.  Schedule appointment w/nutritionist as previously referred.  Patient declined medication for diabetes.  Start Lisinopril for blood pressure and renal protection.  Patient declined statin for cholesterol.  Start OTC Aspirin 81 mg daily for cardiac protection.  Recommend exercising (brisk walking, jogging, swimming, bicycling) 30 minutes/day, 5 days/week.  Recommend weight loss efforts (see www.yourweightmatters.org/category/nutrition for ideas).  Join the Living with Type 2 Diabetes Program to receive healthy recipes, tools to help care for your diabetes, opportunities for online and community support.    Call 1-671.835.8706 or visit [email or web address].org/living    F/U 2-3 weeks: BP check (titrate Lisinopril if not at goal), Repeat labs (CBC, BMP).

## 2025-04-28 NOTE — PROGRESS NOTES
Subjective   Patient ID: Baldev Gonzalez is a 29 y.o. male who presents for Follow-up (BP CHECK ).    HPI   /82 at last visit (no h/o HTN).  Here for BP check.  Labs ordered at last visit.  Here to review results (LFTs not done as ordered).  HbA1c 7.8 (previously prediabetic).  Here for new onset DM visit.  States he recently had dilated eye exam.    Note:  States he had syphilis in the past, received treatment.    Review of Systems  No other complaints.     Objective   /88   Pulse 79   Temp 36.7 °C (98 °F) (Temporal)   Wt 133 kg (293 lb)   SpO2 95%   BMI 37.62 kg/m²     Physical Exam  Constitutional:       General: He is not in acute distress.     Appearance: He is obese.   Cardiovascular:      Rate and Rhythm: Normal rate and regular rhythm.      Heart sounds: Normal heart sounds. No murmur heard.     No friction rub. No gallop.   Pulmonary:      Effort: Pulmonary effort is normal.      Breath sounds: Normal breath sounds. No wheezing, rhonchi or rales.   Neurological:      Mental Status: He is oriented to person, place, and time.   Psychiatric:         Mood and Affect: Mood normal.         Behavior: Behavior normal.     Assessment/Plan   Diagnoses and all orders for this visit:  Type 2 diabetes mellitus without complication, without long-term current use of insulin  -     aspirin 81 mg EC tablet; Take 1 tablet (81 mg) by mouth once daily.  Primary hypertension  -     lisinopril 10 mg tablet; Take 1 tablet (10 mg) by mouth once daily.  Mixed hyperlipidemia  Leukopenia, unspecified type    Reviewed lab results.  Get liver function tests as previously ordered.  Schedule appointment w/nutritionist as previously referred.  Patient declined medication for diabetes.  Start Lisinopril for blood pressure and renal protection.  Patient declined statin for cholesterol.  Start OTC Aspirin 81 mg daily for cardiac protection.  Recommend exercising (brisk walking, jogging, swimming, bicycling) 30 minutes/day,  5 days/week.  Recommend weight loss efforts (see www.yourweightmatters.org/category/nutrition for ideas).  Join the Living with Type 2 Diabetes Program to receive healthy recipes, tools to help care for your diabetes, opportunities for online and community support.    Call 1-775.204.3151 or visit [email or web address].org/living    F/U 2-3 weeks: BP check (titrate Lisinopril if not at goal), Repeat labs (CBC, BMP).

## 2025-08-05 ENCOUNTER — PATIENT OUTREACH (OUTPATIENT)
Dept: CARE COORDINATION | Facility: CLINIC | Age: 30
End: 2025-08-05

## 2025-08-05 NOTE — PROGRESS NOTES
Left message regarding referral from Dr. Eason for MNT. Name and contact info provided for return call

## 2025-08-18 ENCOUNTER — HOSPITAL ENCOUNTER (OUTPATIENT)
Dept: RADIOLOGY | Facility: CLINIC | Age: 30
Discharge: HOME | End: 2025-08-18
Payer: COMMERCIAL

## 2025-08-18 ENCOUNTER — APPOINTMENT (OUTPATIENT)
Facility: CLINIC | Age: 30
End: 2025-08-18

## 2025-08-18 VITALS — HEIGHT: 73 IN | BODY MASS INDEX: 37.76 KG/M2 | WEIGHT: 284.9 LBS

## 2025-08-18 DIAGNOSIS — M25.552 BILATERAL HIP PAIN: ICD-10-CM

## 2025-08-18 DIAGNOSIS — M25.551 BILATERAL HIP PAIN: ICD-10-CM

## 2025-08-18 DIAGNOSIS — M16.0 PRIMARY OSTEOARTHRITIS OF BOTH HIPS: Primary | ICD-10-CM

## 2025-08-18 PROCEDURE — 99214 OFFICE O/P EST MOD 30 MIN: CPT | Performed by: ORTHOPAEDIC SURGERY

## 2025-08-18 PROCEDURE — 73522 X-RAY EXAM HIPS BI 3-4 VIEWS: CPT

## 2025-08-18 PROCEDURE — 3008F BODY MASS INDEX DOCD: CPT | Performed by: ORTHOPAEDIC SURGERY

## 2025-08-18 RX ORDER — MELOXICAM 7.5 MG/1
7.5 TABLET ORAL 2 TIMES DAILY
Qty: 60 TABLET | Refills: 1 | Status: SHIPPED | OUTPATIENT
Start: 2025-08-18 | End: 2025-09-17

## 2025-08-18 ASSESSMENT — PAIN - FUNCTIONAL ASSESSMENT: PAIN_FUNCTIONAL_ASSESSMENT: NO/DENIES PAIN

## 2025-08-26 ENCOUNTER — APPOINTMENT (OUTPATIENT)
Dept: CARE COORDINATION | Facility: CLINIC | Age: 30
End: 2025-08-26

## 2025-09-16 ENCOUNTER — APPOINTMENT (OUTPATIENT)
Dept: CARE COORDINATION | Facility: CLINIC | Age: 30
End: 2025-09-16
Payer: COMMERCIAL